# Patient Record
Sex: FEMALE | Race: WHITE | NOT HISPANIC OR LATINO | Employment: OTHER | URBAN - METROPOLITAN AREA
[De-identification: names, ages, dates, MRNs, and addresses within clinical notes are randomized per-mention and may not be internally consistent; named-entity substitution may affect disease eponyms.]

---

## 2024-05-15 ENCOUNTER — HOSPITAL ENCOUNTER (EMERGENCY)
Facility: HOSPITAL | Age: 64
Discharge: HOME | End: 2024-05-15
Attending: STUDENT IN AN ORGANIZED HEALTH CARE EDUCATION/TRAINING PROGRAM
Payer: MEDICARE

## 2024-05-15 ENCOUNTER — APPOINTMENT (OUTPATIENT)
Dept: RADIOLOGY | Facility: HOSPITAL | Age: 64
End: 2024-05-15
Payer: MEDICARE

## 2024-05-15 VITALS
HEART RATE: 93 BPM | DIASTOLIC BLOOD PRESSURE: 73 MMHG | SYSTOLIC BLOOD PRESSURE: 154 MMHG | RESPIRATION RATE: 18 BRPM | OXYGEN SATURATION: 94 % | TEMPERATURE: 97.9 F

## 2024-05-15 DIAGNOSIS — S72.114A NONDISPLACED FRACTURE OF GREATER TROCHANTER OF RIGHT FEMUR, INITIAL ENCOUNTER FOR CLOSED FRACTURE (MULTI): ICD-10-CM
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
## 2024-05-15 PROCEDURE — 73552 X-RAY EXAM OF FEMUR 2/>: CPT | Performed by: RADIOLOGY

## 2024-05-15 PROCEDURE — 72170 X-RAY EXAM OF PELVIS: CPT | Performed by: RADIOLOGY

## 2024-05-15 PROCEDURE — 72170 X-RAY EXAM OF PELVIS: CPT

## 2024-05-15 PROCEDURE — 73552 X-RAY EXAM OF FEMUR 2/>: CPT | Mod: RT

## 2024-05-15 PROCEDURE — 99284 EMERGENCY DEPT VISIT MOD MDM: CPT

## 2024-05-15 RX ORDER — ACETAMINOPHEN 325 MG/1
975 TABLET ORAL ONCE
Status: COMPLETED | OUTPATIENT
Start: 2024-05-15 | End: 2024-05-15

## 2024-05-15 RX ADMIN — ACETAMINOPHEN 975 MG: 325 TABLET ORAL at 21:36

## 2024-05-15 ASSESSMENT — COLUMBIA-SUICIDE SEVERITY RATING SCALE - C-SSRS
6. HAVE YOU EVER DONE ANYTHING, STARTED TO DO ANYTHING, OR PREPARED TO DO ANYTHING TO END YOUR LIFE?: NO
1. IN THE PAST MONTH, HAVE YOU WISHED YOU WERE DEAD OR WISHED YOU COULD GO TO SLEEP AND NOT WAKE UP?: NO
2. HAVE YOU ACTUALLY HAD ANY THOUGHTS OF KILLING YOURSELF?: NO

## 2024-05-15 NOTE — ED PROVIDER NOTES
"HPI   Chief Complaint   Patient presents with    Fall     Pt biba from home after a trip and fall. Unsure if she tripped over her dog or the curb. pt complaining of right hip pain at 8/10 and unable to bear weight       Brought in by EMS after a fall for 6 PM today.  States that she was outside with her dog attempting to meet others to go for a walk when she fell onto her right side.  She is not sure why she fell.  No preceding lightheadedness, chest pain, shortness of breath, dizziness, or focal numbness/weakness.  Has had an issue with her ankle \"giving out in the past\" and wonders if this happened again.  She also wonders if she tripped over her dog's leash.  Nonetheless, she is experiencing right hip pain after the aforementioned fall.  She not strike her head.  She not lose consciousness.  She was noted to have an abrasion over her right elbow, which was bandaged with a Band-Aid.  States that she had a tetanus shot within the past 5 years.  She is not on anticoagulation.      History provided by:  Patient   used: No                        Iris Coma Scale Score: 15                     Patient History   Past Medical History:   Diagnosis Date    Other specified health status     No pertinent past surgical history     No past surgical history on file.  No family history on file.  Social History     Tobacco Use    Smoking status: Not on file    Smokeless tobacco: Not on file   Substance Use Topics    Alcohol use: Not on file    Drug use: Not on file       Physical Exam   ED Triage Vitals [05/15/24 1904]   Temperature Heart Rate Respirations BP   36.6 °C (97.9 °F) 93 18 154/73      Pulse Ox Temp src Heart Rate Source Patient Position   94 % -- -- --      BP Location FiO2 (%)     -- --       Physical Exam  Vitals and nursing note reviewed.   HENT:      Head: Atraumatic.      Comments: No Maxwell sign.  No raccoon eyes.     Right Ear: Tympanic membrane normal.      Left Ear: Tympanic membrane " normal.      Mouth/Throat:      Mouth: Mucous membranes are moist.   Eyes:      Extraocular Movements: Extraocular movements intact.      Conjunctiva/sclera: Conjunctivae normal.      Pupils: Pupils are equal, round, and reactive to light.   Cardiovascular:      Rate and Rhythm: Normal rate and regular rhythm.      Pulses: Normal pulses.   Pulmonary:      Effort: Pulmonary effort is normal.      Breath sounds: Normal breath sounds.   Abdominal:      Palpations: Abdomen is soft.      Tenderness: There is no abdominal tenderness.   Musculoskeletal:         General: No deformity.      Cervical back: Normal range of motion. No tenderness.      Comments: No spinal tenderness palpation.  Pelvis is stable.  Right upper extremity: Band-Aid over the olecranon region with a skin tear of approximately 0.5 cm underneath that is well-approximated with send Band-Aid; patient would like me to keep the Band-Aid in place as opposed to Steri-Strips.  No bony tenderness palpation.  Full active range of motion.  Neurovascularly intact.  Left upper extremity: No obvious deformities.  No bony tenderness palpation.  Full active range of motion.  Neurovascularly intact.  Right lower extremity: Mild tenderness palpation of the proximal femur.  Some discomfort with logroll.  Is able to flex at the hip.  Extensor mechanism is intact.  No other bony tenderness palpation of the extremity.  Neurovascularly intact.  No obvious deformities.  Left lower extremity: No obvious deformities.  No bony tenderness palpation.  Full active range of motion.  Extensor mechanism is intact.  Neurovascularly intact.   Skin:     General: Skin is warm and dry.   Neurological:      Mental Status: She is alert.      Comments: GCS 15.  Moving all extremities         ED Course & MDM   ED Course as of 05/15/24 2153   Wed May 15, 2024   1945 XR femur right 2+ views  Per my view, there is no obvious prosthetic dislocation.  There is possibly a lucency in the greater  trochanteric region. [AB]   2046 Radiologist concerned for greater troch periprosthetic fracture.  Patient states THR done at [facility] and does not remember surgeon.  Will speak with Ortho at [facility]. [AB]   2050 Spoke with Dr. Serrano -- recs CT to eval for need for intervention [AB]   2100 Dr. Serrano reached back out after reviewing XR.  States that patient's fracture is non-operative and does not need CT at this time.  States if patient can ambulate/has a safe discharge plan, then she can be discharged home and follow-up with her Orthopedic Surgeon. [AB]      ED Course User Index  [AB] Alban Myles MD         Diagnoses as of 05/15/24 2153   Fall, initial encounter   Nondisplaced fracture of greater trochanter of right femur, initial encounter for closed fracture (Multi)       Medical Decision Making  Brought in by EMS after mechanical fall with right hip pain.    Well-appearing.  Hemodynamically stable.  Primary survey intact.    Considered CT of the head and C-spine.  However, patient says she not strike her head.  Additionally, patient is [place] CT head and CT C-spine rules negative.    X-ray concerning for right greater trochanteric fracture.  Discussed with orthopedic surgery, as per the ED course.    Patient ambulatory in the emergency department with walker.  Son will pick her up and keep a close eye on her at home.  Patient feels comfortable with discharge and follow-up with orthopedic surgeon.  Discussed return precautions.    Amount and/or Complexity of Data Reviewed  Independent Historian: EMS  Radiology: ordered and independent interpretation performed. Decision-making details documented in ED Course.  Discussion of management or test interpretation with external provider(s): The orthopedic surgeon, Dr. Serrano        Procedure  Procedures     Alban Myles MD  05/15/24 2154

## 2024-05-16 NOTE — DISCHARGE INSTRUCTIONS
You are found to have a fracture of your femur.  In particular, you have a fracture of your greater trochanter of your right femur.  This does not appear to extend your your prosthesis.  Because of this, you can be safely discharged to follow-up with your primary care physician as well as your orthopedic surgeon.  If you are unable to bear weight or ambulate at home, please return to the emergency department.  If you have any other concerns please return to the emergency department.